# Patient Record
Sex: FEMALE | Race: WHITE | NOT HISPANIC OR LATINO | Employment: OTHER | URBAN - METROPOLITAN AREA
[De-identification: names, ages, dates, MRNs, and addresses within clinical notes are randomized per-mention and may not be internally consistent; named-entity substitution may affect disease eponyms.]

---

## 2024-01-17 ENCOUNTER — OFFICE VISIT (OUTPATIENT)
Dept: PSYCHOLOGY | Facility: CLINIC | Age: 74
End: 2024-01-17
Payer: MEDICARE

## 2024-01-17 DIAGNOSIS — R41.9 COGNITIVE COMPLAINTS: Primary | ICD-10-CM

## 2024-01-17 PROBLEM — F41.9 ANXIETY: Status: ACTIVE | Noted: 2024-01-17

## 2024-01-17 PROBLEM — R41.89 COGNITIVE DECLINE: Status: ACTIVE | Noted: 2024-01-17

## 2024-01-17 PROCEDURE — 96138 PSYCL/NRPSYC TECH 1ST: CPT | Mod: AH | Performed by: CLINICAL NEUROPSYCHOLOGIST

## 2024-01-17 PROCEDURE — 96139 PSYCL/NRPSYC TST TECH EA: CPT | Mod: AH | Performed by: CLINICAL NEUROPSYCHOLOGIST

## 2024-01-17 PROCEDURE — 96116 NUBHVL XM PHYS/QHP 1ST HR: CPT | Performed by: CLINICAL NEUROPSYCHOLOGIST

## 2024-01-17 PROCEDURE — 1159F MED LIST DOCD IN RCRD: CPT | Performed by: CLINICAL NEUROPSYCHOLOGIST

## 2024-01-17 PROCEDURE — 96133 NRPSYC TST EVAL PHYS/QHP EA: CPT | Mod: AH | Performed by: CLINICAL NEUROPSYCHOLOGIST

## 2024-01-17 PROCEDURE — 96139 PSYCL/NRPSYC TST TECH EA: CPT | Performed by: CLINICAL NEUROPSYCHOLOGIST

## 2024-01-17 PROCEDURE — 96132 NRPSYC TST EVAL PHYS/QHP 1ST: CPT | Performed by: CLINICAL NEUROPSYCHOLOGIST

## 2024-01-17 PROCEDURE — 96132 NRPSYC TST EVAL PHYS/QHP 1ST: CPT | Mod: AH | Performed by: CLINICAL NEUROPSYCHOLOGIST

## 2024-01-17 PROCEDURE — 96133 NRPSYC TST EVAL PHYS/QHP EA: CPT | Performed by: CLINICAL NEUROPSYCHOLOGIST

## 2024-01-17 PROCEDURE — 96116 NUBHVL XM PHYS/QHP 1ST HR: CPT | Mod: AH | Performed by: CLINICAL NEUROPSYCHOLOGIST

## 2024-01-17 PROCEDURE — 1160F RVW MEDS BY RX/DR IN RCRD: CPT | Performed by: CLINICAL NEUROPSYCHOLOGIST

## 2024-01-17 PROCEDURE — 96138 PSYCL/NRPSYC TECH 1ST: CPT | Performed by: CLINICAL NEUROPSYCHOLOGIST

## 2024-01-17 RX ORDER — LEVOTHYROXINE SODIUM 75 UG/1
TABLET ORAL
COMMUNITY
Start: 2023-11-15

## 2024-01-17 RX ORDER — SIMVASTATIN 40 MG/1
40 TABLET, FILM COATED ORAL DAILY
COMMUNITY
Start: 2023-10-28

## 2024-01-17 RX ORDER — ERGOCALCIFEROL 1.25 MG/1
CAPSULE ORAL
COMMUNITY
Start: 2023-04-11 | End: 2024-04-24 | Stop reason: ALTCHOICE

## 2024-01-17 RX ORDER — LOSARTAN POTASSIUM 50 MG/1
50 TABLET ORAL DAILY
COMMUNITY
Start: 2023-09-14

## 2024-01-17 NOTE — LETTER
2024     Joseph Hopkins MD  [address] [facility] [address]  [facility] [address]    Patient: Soraya Remy   YOB: 1950   Date of Visit: 2024       Dear Dr. Joseph Hopkins MD:    Thank you for referring Soraya Remy to me for evaluation. Below are my notes for this consultation.  If you have questions, please do not hesitate to call me. I look forward to following your patient along with you.       Sincerely,     Edith Ferguson, PhD      CC: No Recipients  ______________________________________________________________________________________    Neuropsychological Evaluation    Name: Soraya Remy  : 1950  MRN: [id number]  Referring Provider: Joseph Hopkins MD (Neurology)  Date of Service: 2024    Reason for Referral:  Ms. Remy was referred for neuropsychological assessment in the context of cognitive concerns. The purpose of this assessment was reviewed with the patient and written informed consent was obtained.    DIAGNOSIS:   1. Cognitive complaints       IMPRESSIONS/RECOMMENDATIONS:     Diagnostic Impression/Recommendations: Ms. Remy presented for evaluation of cognitive complaints though the results of this brief evaluation occur in the context of evidence for suboptimal test engagement. This limits my ability to interpret the data with an appropriate degree of confidence and draw sound diagnostic conclusions. The possibility of true cognitive impairment is not ruled out, though evaluation in the context of full test engagement is warranted to understand her cognitive profile. She does have known cognitive risk factors including cerebrovascular risk factors, hypothyroidism (with last known TSH to be high), disrupted sleep, and untreated anxiety. Targeting anxiety, sleep,  and ensuring that her hypothyroidism is adequately treated are all strongly recommended. She is also encouraged to complete the MRI that was previously ordered by Dr. Hopkins.  Neuropsychological re-evaluation could be considered in the context of optimized medical and psychiatric status.     Thank you for the opportunity to see this patient.    Please contact me with any additional questions or comments regarding this case.    ////////////////////////////////////////////////////////    HISTORY/PRIOR WORKUP    History: Ms. Remy is a 73-year-old, right-handed, , female with approximately 16 years of formal education. She is retired. She lives with her long term (35 years) significant other.      I found no prior neuropsychological evaluation in the medical record.     Briefly, she saw Dr. Hopkins on 08/21/2023 for evaluation of memory loss, referred by her PCP. Her neurological exam was noted to be fairly normal, with the exception of scoring 3/30 on the SLUMS (was noted to repeat questions and then appeared to skirt around the answer). An undiagnosed/untreated anxiety component was appreciated. She also reported her hand sometimes shakes. She was referred for the current evaluation and brain MRI (not completed). Prior labs from her PCP were noted to show recent high TSH. See note.     Medical history with potential for primary or secondary adverse cognitive effects include HTN, DLD, hypothyroidism, anxiety.     CLINICAL INTERVIEW:    Presenting Problem: Ms. Remy was accompanied to the current evaluation by her significant other Varun who was present for the clinical interview and provided collateral information. They reported the onset of cognitive changes beginning about 2 years ago in the context of thyroid medication adjustments and her having difficulty with taking the medication appropriately. There was worsening over time until her thyroid levels stabilized, at which point cognition also reportedly stabilized. Varun noted she retired 3-4 years ago and the lack of structure/organization seems to undermine cognitive skills; fatigue and anxiety also serve as exacerbating  factors. There are no marked cognitive fluctuations.     Specific concerns include difficulty articulating her thoughts/explaining things because she cannot find her words. She may find an alternative word to use; she does not appear to have loss of semantic knowledge. She can forget recent events (e.g., forgot grandkids came to visit about a month ago and a cue did not jog her memory). She routinely forgets to charge her cell phone. She misplaces items more frequently and may also put items away in the wrong (but not an unreasonable) location, despite have always stored such items in a designated location. She has difficulty focusing and is prone to losing her train of thought. Varun has noticed a tendency for her to jump around between tasks (eventually completes). Multitasking was never a strength and this is worse.     Functionally, she is now managing her medications; there was a period of time where she was having difficulty and Varun was assisting, though she is now reliable. Varun manages the finances (longstanding); she was previously writing some checks, though stopped due to writing difficulty related to hand shaking. Varun has been making/managing medical appointments for the past year. They shop together. She is no longer cooking like she used (does not feel comfortable). She engages in household tasks. She stopped driving 1-1.5 years ago because she did not feel comfortable (denied MVAs, tickets, or getting lost).     Psychiatric History/Status: She reported a longstanding tendency towards being a worrier that is stable; she has never been treated for anxiety. She denied adonis feelings of sadness, though described pessimism and social withdrawal; Varun reported she has always been private and somewhat “reclusive” but she is not talking with her family as much as she used to. When asked about her mood and how she has been feeling lately, she said “I wonder why I don't have more feelings.” No mood swings,  irritability, or personality changes endorsed. No recent changes in energy or appetite.     She denied suicidal or homicidal ideation/intent and also denied experiencing auditory or visual hallucinations. No paranoid type thoughts/beliefs.    There was no bradykinesia or hyperkinesia noted, and speech was of normal rate and volume. Her thoughts were well organized, with no evidence of tangentiality, circumstantiality, loosening of associations, or flight of ideas. There was no evidence of delusions, hallucinations, or illusions/misperceptions.    Substance Use: There is no reported history of significant alcohol abuse. There is no reported illicit substance use or deliberate misuse of prescription medications. She does not use tobacco products. Caffeine use is minimal.    Sleep: She has no difficulty initiating sleep, though does have trouble with sleep maintenance (x1-1.5 years). She obtains a variable number of hours of sleep per night. Varun noted she can readily fall asleep when idle (e.g., passenger in the car). She snores, but does not have known apneic events or REM sleep behavior disturbance.       Additional Medical History/Rule-Outs: There is no known history of head injury with alteration of consciousness, seizures, clinical cerebrovascular events, or other neurological disorder. There have been no gait disturbances or recent unprovoked falls. She reported intermittent right upper extremity tremor. There have been no recent episodes of urinary incontinence. Hearing and vision are adequate. She experiences dizziness; no syncopal events. She is not prone to headaches or migraines. She denied pain.    Developmental History: History is reportedly unremarkable for developmental milestones.     Academic/Employment/Social History: No personal history of academic difficulties. She graduated college. 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     Family History: There is a family history of dementia later in life (mother).     Legal History: She reported having no active legal issues.    ==========    Procedures/Tests:  In addition to the clinical interview, the following tests were administered:      Praxis screening items*  Performance validity measures  Wechsler Adult Intelligence Scale - IV (WAIS-IV)   Digit Span   Coding  Wechsler Test of Adult Reading (WTAR)  Trail Making Test parts A & B (Trails A, B)  Repeatable Battery for the Assessment of Neuropsychological Status (RBANS)   Line Orientation  Neuropsychological Assessment Battery (NAB)   Naming  Controlled Oral Word Fluency Test (COWAT)  Semantic Fluency  Brief Visuospatial Memory Test - Revised (BVMT-R)  Heller Verbal Learning Test - Revised (HVLT-R)  =====================================================================  Note: Testing was completed by a psychometrist unless otherwise noted; *indicates administered by neuropsychologist. Test results were interpreted in the context of appropriate normative data.    RESULTS:  General Behavior: She was alert. She was adequately groomed, appropriately dressed, and appeared her stated age. Affect appeared anxious and flat. She understood the purpose of the assessment. She provided her date of birth, though could not state her age (“I can't do the math.”). She was unable to provide the date; she did get the year with multiple choice options. She was oriented to place. She could not name the current [place] president or his predecessor (and did not benefit from cues). Spontaneous speech was fluent, grammatically correct, and conveyed information adequately. No paraphasias or circumlocutions were noted in spontaneous conversation. Verbal prosody was normal. Ideomotor praxis was normal on coarse screening. Comprehension was unremarkable in terms of her ability to understand interview questions. No perseverative, impulsive, jocular or  socially inappropriate tendencies were observed in incidental behavior.    During testing, she was pleasant, though anxious. Rapport was easily established. She was often reluctant to answer questions, seeking assistance on how to do tasks “right.” She required task instructions to be repeated and slowed down; she required some task instructions to be repeated mid-task after completing sample items with confidence. She was quick to give up when confused/unsure. She offered tangential remarks about her performance. Her testing pace was very slow.     Measures of performance validity fell below expectations on multiple indices which suggests suboptimal/inconsistent test engagement. Reduced test engagement can occur for a variety of reasons, including (but not limited to) emotional distress and fatigue. Ms. Remy appeared anxious during testing and I suspect this contributed to her testing approach at least to some degree. Regardless of the cause for reduced test engagement, I cannot interpret areas of possible impairment with a reasonable degree of confidence. Areas in which she performed in the broad average range can be assumed to reflect at least a minimal level of ability and will be provided below. Areas of possible impairment are not addressed as I cannot discern if performance reflects true impairment vs reduced test engagement.    Estimated baseline abilities fall in the average range based on single word reading ability and demographic variables. Basic auditory attentional capacity was low average. Basic design copy was intact.     ===========================================================    Cognitive testing was administered and interpretation of results included consideration of appropriate and relevant cultural-linguistic and demographic factors.  Cognitive testing was administered and results of assessment informed the determination of diagnosis or further clarified etiological factors of cognitive  impairment or complaints.    Time based service: 69505 (55 min, 1 unit); 33840 (60 min, 1 unit); 76157 (36 min, 1 units); 82632 (30 min, 1 unit); 93095 (135 min, 5 units).

## 2024-01-17 NOTE — PROGRESS NOTES
Neuropsychological Evaluation    Name: Soraya Remy  : 1950  MRN: [id number]  Referring Provider: Joseph Hopkins MD (Neurology)  Date of Service: 2024    Reason for Referral:  Ms. Remy was referred for neuropsychological assessment in the context of cognitive concerns. The purpose of this assessment was reviewed with the patient and written informed consent was obtained.    DIAGNOSIS:   1. Cognitive complaints       IMPRESSIONS/RECOMMENDATIONS:     Diagnostic Impression/Recommendations: Ms. Remy presented for evaluation of cognitive complaints though the results of this brief evaluation occur in the context of evidence for suboptimal test engagement. This limits my ability to interpret the data with an appropriate degree of confidence and draw sound diagnostic conclusions. The possibility of true cognitive impairment is not ruled out, though evaluation in the context of full test engagement is warranted to understand her cognitive profile. She does have known cognitive risk factors including cerebrovascular risk factors, hypothyroidism (with last known TSH to be high), disrupted sleep, and untreated anxiety. Targeting anxiety, sleep,  and ensuring that her hypothyroidism is adequately treated are all strongly recommended. She is also encouraged to complete the MRI that was previously ordered by Dr. Hopkins. Neuropsychological re-evaluation could be considered in the context of optimized medical and psychiatric status.     Thank you for the opportunity to see this patient.    Please contact me with any additional questions or comments regarding this case.    ////////////////////////////////////////////////////////    HISTORY/PRIOR WORKUP    History: Ms. Remy is a 73-year-old, right-handed, , female with approximately 16 years of formal education. She is retired. She lives with her long term (35 years) significant other.      I found no prior neuropsychological evaluation in the medical record.      Briefly, she saw Dr. Hopkins on 08/21/2023 for evaluation of memory loss, referred by her PCP. Her neurological exam was noted to be fairly normal, with the exception of scoring 3/30 on the SLUMS (was noted to repeat questions and then appeared to skirt around the answer). An undiagnosed/untreated anxiety component was appreciated. She also reported her hand sometimes shakes. She was referred for the current evaluation and brain MRI (not completed). Prior labs from her PCP were noted to show recent high TSH. See note.     Medical history with potential for primary or secondary adverse cognitive effects include HTN, DLD, hypothyroidism, anxiety.     CLINICAL INTERVIEW:    Presenting Problem: Ms. Remy was accompanied to the current evaluation by her significant other Varun who was present for the clinical interview and provided collateral information. They reported the onset of cognitive changes beginning about 2 years ago in the context of thyroid medication adjustments and her having difficulty with taking the medication appropriately. There was worsening over time until her thyroid levels stabilized, at which point cognition also reportedly stabilized. Varun noted she retired 3-4 years ago and the lack of structure/organization seems to undermine cognitive skills; fatigue and anxiety also serve as exacerbating factors. There are no marked cognitive fluctuations.     Specific concerns include difficulty articulating her thoughts/explaining things because she cannot find her words. She may find an alternative word to use; she does not appear to have loss of semantic knowledge. She can forget recent events (e.g., forgot grandkids came to visit about a month ago and a cue did not jog her memory). She routinely forgets to charge her cell phone. She misplaces items more frequently and may also put items away in the wrong (but not an unreasonable) location, despite have always stored such items in a designated location.  She has difficulty focusing and is prone to losing her train of thought. Varun has noticed a tendency for her to jump around between tasks (eventually completes). Multitasking was never a strength and this is worse.     Functionally, she is now managing her medications; there was a period of time where she was having difficulty and Varun was assisting, though she is now reliable. Varun manages the finances (longstanding); she was previously writing some checks, though stopped due to writing difficulty related to hand shaking. Varun has been making/managing medical appointments for the past year. They shop together. She is no longer cooking like she used (does not feel comfortable). She engages in household tasks. She stopped driving 1-1.5 years ago because she did not feel comfortable (denied MVAs, tickets, or getting lost).     Psychiatric History/Status: She reported a longstanding tendency towards being a worrier that is stable; she has never been treated for anxiety. She denied adonis feelings of sadness, though described pessimism and social withdrawal; Varun reported she has always been private and somewhat “reclusive” but she is not talking with her family as much as she used to. When asked about her mood and how she has been feeling lately, she said “I wonder why I don't have more feelings.” No mood swings, irritability, or personality changes endorsed. No recent changes in energy or appetite.     She denied suicidal or homicidal ideation/intent and also denied experiencing auditory or visual hallucinations. No paranoid type thoughts/beliefs.    There was no bradykinesia or hyperkinesia noted, and speech was of normal rate and volume. Her thoughts were well organized, with no evidence of tangentiality, circumstantiality, loosening of associations, or flight of ideas. There was no evidence of delusions, hallucinations, or illusions/misperceptions.    Substance Use: There is no reported history of significant  alcohol abuse. There is no reported illicit substance use or deliberate misuse of prescription medications. She does not use tobacco products. Caffeine use is minimal.    Sleep: She has no difficulty initiating sleep, though does have trouble with sleep maintenance (x1-1.5 years). She obtains a variable number of hours of sleep per night. Varun noted she can readily fall asleep when idle (e.g., passenger in the car). She snores, but does not have known apneic events or REM sleep behavior disturbance.       Additional Medical History/Rule-Outs: There is no known history of head injury with alteration of consciousness, seizures, clinical cerebrovascular events, or other neurological disorder. There have been no gait disturbances or recent unprovoked falls. She reported intermittent right upper extremity tremor. There have been no recent episodes of urinary incontinence. Hearing and vision are adequate. She experiences dizziness; no syncopal events. She is not prone to headaches or migraines. She denied pain.    Developmental History: History is reportedly unremarkable for developmental milestones.     Academic/Employment/Social History: No personal history of academic difficulties. She graduated college. 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     Family History: There is a family history of dementia later in life (mother).     Legal History: She reported having no active legal issues.    ==========    Procedures/Tests:  In addition to the clinical interview, the following tests were administered:      Praxis screening items*  Performance validity measures  Wechsler Adult Intelligence Scale - IV (WAIS-IV)   Digit Span   Coding  Wechsler Test of Adult Reading (WTAR)  Trail Making Test parts A & B (Trails A, B)  Repeatable Battery for the Assessment of Neuropsychological Status (RBANS)   Line Orientation  Neuropsychological  Assessment Battery (NAB)   Naming  Controlled Oral Word Fluency Test (COWAT)  Semantic Fluency  Brief Visuospatial Memory Test - Revised (BVMT-R)  Heller Verbal Learning Test - Revised (HVLT-R)  =====================================================================  Note: Testing was completed by a psychometrist unless otherwise noted; *indicates administered by neuropsychologist. Test results were interpreted in the context of appropriate normative data.    RESULTS:  General Behavior: She was alert. She was adequately groomed, appropriately dressed, and appeared her stated age. Affect appeared anxious and flat. She understood the purpose of the assessment. She provided her date of birth, though could not state her age (“I can't do the math.”). She was unable to provide the date; she did get the year with multiple choice options. She was oriented to place. She could not name the current [place] president or his predecessor (and did not benefit from cues). Spontaneous speech was fluent, grammatically correct, and conveyed information adequately. No paraphasias or circumlocutions were noted in spontaneous conversation. Verbal prosody was normal. Ideomotor praxis was normal on coarse screening. Comprehension was unremarkable in terms of her ability to understand interview questions. No perseverative, impulsive, jocular or socially inappropriate tendencies were observed in incidental behavior.    During testing, she was pleasant, though anxious. Rapport was easily established. She was often reluctant to answer questions, seeking assistance on how to do tasks “right.” She required task instructions to be repeated and slowed down; she required some task instructions to be repeated mid-task after completing sample items with confidence. She was quick to give up when confused/unsure. She offered tangential remarks about her performance. Her testing pace was very slow.     Measures of performance validity fell below expectations  on multiple indices which suggests suboptimal/inconsistent test engagement. Reduced test engagement can occur for a variety of reasons, including (but not limited to) emotional distress and fatigue. Ms. Remy appeared anxious during testing and I suspect this contributed to her testing approach at least to some degree. Regardless of the cause for reduced test engagement, I cannot interpret areas of possible impairment with a reasonable degree of confidence. Areas in which she performed in the broad average range can be assumed to reflect at least a minimal level of ability and will be provided below. Areas of possible impairment are not addressed as I cannot discern if performance reflects true impairment vs reduced test engagement.    Estimated baseline abilities fall in the average range based on single word reading ability and demographic variables. Basic auditory attentional capacity was low average. Basic design copy was intact.     ===========================================================    Cognitive testing was administered and interpretation of results included consideration of appropriate and relevant cultural-linguistic and demographic factors.  Cognitive testing was administered and results of assessment informed the determination of diagnosis or further clarified etiological factors of cognitive impairment or complaints.    Time based service: 74079 (55 min, 1 unit); 44687 (60 min, 1 unit); 46946 (36 min, 1 units); 94207 (30 min, 1 unit); 33008 (135 min, 5 units).

## 2024-01-29 ENCOUNTER — APPOINTMENT (OUTPATIENT)
Dept: NEUROLOGY | Facility: HOSPITAL | Age: 74
End: 2024-01-29

## 2024-04-18 ENCOUNTER — TELEPHONE (OUTPATIENT)
Dept: NEUROLOGY | Facility: HOSPITAL | Age: 74
End: 2024-04-18
Payer: MEDICARE

## 2024-04-18 NOTE — TELEPHONE ENCOUNTER
I now see that pt has had done neuropsych testing and results are here.  However, MRI brain not done, as well as B12.    Do you want to see this pt or reschedule?    I called her and she doesn't think she had the MRI and I don't see any results.  She remains confused.    Thank you.

## 2024-04-24 ENCOUNTER — LAB (OUTPATIENT)
Dept: LAB | Facility: LAB | Age: 74
End: 2024-04-24
Payer: MEDICARE

## 2024-04-24 ENCOUNTER — OFFICE VISIT (OUTPATIENT)
Dept: NEUROLOGY | Facility: HOSPITAL | Age: 74
End: 2024-04-24
Payer: MEDICARE

## 2024-04-24 VITALS — DIASTOLIC BLOOD PRESSURE: 86 MMHG | WEIGHT: 112.1 LBS | HEART RATE: 70 BPM | SYSTOLIC BLOOD PRESSURE: 155 MMHG

## 2024-04-24 DIAGNOSIS — F03.90 DEMENTIA, UNSPECIFIED DEMENTIA SEVERITY, UNSPECIFIED DEMENTIA TYPE, UNSPECIFIED WHETHER BEHAVIORAL, PSYCHOTIC, OR MOOD DISTURBANCE OR ANXIETY (MULTI): Primary | ICD-10-CM

## 2024-04-24 DIAGNOSIS — F03.90 DEMENTIA, UNSPECIFIED DEMENTIA SEVERITY, UNSPECIFIED DEMENTIA TYPE, UNSPECIFIED WHETHER BEHAVIORAL, PSYCHOTIC, OR MOOD DISTURBANCE OR ANXIETY (MULTI): ICD-10-CM

## 2024-04-24 DIAGNOSIS — F41.9 ANXIETY: ICD-10-CM

## 2024-04-24 LAB — VIT B12 SERPL-MCNC: 191 PG/ML (ref 211–911)

## 2024-04-24 PROCEDURE — 1157F ADVNC CARE PLAN IN RCRD: CPT | Performed by: PSYCHIATRY & NEUROLOGY

## 2024-04-24 PROCEDURE — 99214 OFFICE O/P EST MOD 30 MIN: CPT | Performed by: PSYCHIATRY & NEUROLOGY

## 2024-04-24 PROCEDURE — 1159F MED LIST DOCD IN RCRD: CPT | Performed by: PSYCHIATRY & NEUROLOGY

## 2024-04-24 PROCEDURE — 82607 VITAMIN B-12: CPT

## 2024-04-24 PROCEDURE — 1160F RVW MEDS BY RX/DR IN RCRD: CPT | Performed by: PSYCHIATRY & NEUROLOGY

## 2024-04-24 PROCEDURE — 1036F TOBACCO NON-USER: CPT | Performed by: PSYCHIATRY & NEUROLOGY

## 2024-04-24 PROCEDURE — 36415 COLL VENOUS BLD VENIPUNCTURE: CPT

## 2024-04-24 PROCEDURE — G2211 COMPLEX E/M VISIT ADD ON: HCPCS | Performed by: PSYCHIATRY & NEUROLOGY

## 2024-04-24 ASSESSMENT — ENCOUNTER SYMPTOMS
OCCASIONAL FEELINGS OF UNSTEADINESS: 0
DEPRESSION: 0
LOSS OF SENSATION IN FEET: 0

## 2024-04-24 NOTE — PROGRESS NOTES
"Follow-up visit    Visit type: Follow-up    PCP: Lisa Hendrickson, APRN-CNP.    Subjective     Soraya Remy is a 73 y.o. year old female here for follow-up. Last seen 8/21/23.     Patient is accompanied by significant other.       HPI    I previously saw her 8/21/23 for memory loss. Came with significant other, lives with significant other. Came late, seen anyway. Some notes from PCP seen scanned in EMR. Referred to neurology 9/2022. I see she was scheduled to see another  neurologist 5/2023 but was \"cancelled\". Referred again. No labs. States she can't answer question. ~1-2 years now. \"I used to be smart\". Per significant other, it appears problem is not just memory. She will remember details of things that she's not being asked for. But when asked directly she can't remember. She was found to have hypothyroidism. Thyroid med started, dose change \"helped quite a bit\". No triggers noted. She retired prior to this happening. Was a manager at a [employer]. Was not having any problems at work apparently. (+) anxiety--not diagnosed. Not treated. Cooking at home, not burning things. Forgets where things are. \"Where's the pan for this?\". Not driving anymore, states she stopped as driving \"was starting to make me nervous\". 1.5 years ago stopped. Used to be taking care of the bills, but was misplacing bills and missing payments, so now does it with significant other. PCP has not done any brain scan. Unknown lab work. Carotid US ordered by PCP, but not done due to ? code. No hallucinations. (+) mother has dementia, but at 90+ yo. No sz. No stroke. Also belatedly tells me sometimes shakes when using hand. Non-smoker. Hx drinking alcohol, 2 glasses of wine per week. During last visit, discussed check B12 level, do MRI brain wo, and do neuropsych testing.    Since last visit, neuropsych testing done was inconclusive.    Here for followup.    B12 not done. MRI brain not done. They state they were not aware it needs done. They " were in the office for the first and last visit and would have left with paperwork for it.    Significant other states she seems better for some reason. Conversations she seems somewhat better. May remember something else later. Can't come up with words, word finding difficulty.    Patient Active Problem List   Diagnosis    Cognitive decline    Anxiety       No Known Allergies    Current Outpatient Medications:     levothyroxine (Synthroid, Levoxyl) 75 mcg tablet, TAKE 1 TABLET BY MOUTH EVERY DAY IN THE MORNING ON EMPTY STOMACH, Disp: , Rfl:     losartan (Cozaar) 50 mg tablet, Take 1 tablet (50 mg) by mouth once daily., Disp: , Rfl:     simvastatin (Zocor) 40 mg tablet, Take 1 tablet (40 mg) by mouth once daily., Disp: , Rfl:     ergocalciferol (Vitamin D-2) 1.25 MG (65901 UT) capsule, TAKE 1 CAPSULE BY MOUTH ONCE A WEEK 60, Disp: , Rfl:      Objective     /86   Pulse 70   Wt 50.8 kg (112 lb 1.6 oz)      SLUMS 3/30 on 8/21/23      Awake, alert, oriented x3, in no distress  Well-nourished, ambulatory independently    Mental status exam as above, conversant   Full EOMs intact, no nystagmus, no ptosis   No facial droop   Hearing grossly intact   No dysarthria    Motor strength is at least antigravity on all extremities  Normal gait      Assessment/Plan     1. Dementia (?)  2. Anxiety, ? undiagnosed, untreated  Neuro exam actually fairly normal except for SLUMS 3/30  Treating hypothyroidism apparently improved memory to some extent     Discussed, my concern is that of clinical dementia  Unclear what, if any, possible anxiety (untreated) is contributing to this problem--it appears that pt has had chronic anxiety symptoms     Unfortunately, pt has not had testing done. The neuropsych testing that she did, was inconclusive.    Plans:  Still to check B12 level  Still to do MRI brain wo  3.   Refer to geriatric psychiatrist  4.   Optimize hypothyroid state c/o PCP     rtc after testing    All questions were answered.   Pt knows how to contact my office in case pt has any questions or concerns.    Joseph Hopkins MD

## 2024-04-24 NOTE — LETTER
"April 24, 2024     LANCE Perez  [address] [facility] [address]    Patient: Soraya Remy   YOB: 1950   Date of Visit: 4/24/2024       Dear LANCE Clark:    Thank you for referring Soraya Remy to me for evaluation. Below are my notes for this consultation.  If you have questions, please do not hesitate to call me. I look forward to following your patient along with you.       Sincerely,     Joseph Hopkins MD      CC: No Recipients  ______________________________________________________________________________________    Follow-up visit    Visit type: Follow-up    PCP: LANCE Perez.    Subjective    Soraya Remy is a 73 y.o. year old female here for follow-up. Last seen 8/21/23.     Patient is accompanied by significant other.       HPI    I previously saw her 8/21/23 for memory loss. Came with significant other, lives with significant other. Came late, seen anyway. Some notes from PCP seen scanned in EMR. Referred to neurology 9/2022. I see she was scheduled to see another  neurologist 5/2023 but was \"cancelled\". Referred again. No labs. States she can't answer question. ~1-2 years now. \"I used to be smart\". Per significant other, it appears problem is not just memory. She will remember details of things that she's not being asked for. But when asked directly she can't remember. She was found to have hypothyroidism. Thyroid med started, dose change \"helped quite a bit\". No triggers noted. She retired prior to this happening. 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 (+) anxiety--not diagnosed. Not treated. Cooking at home, not burning things. Forgets where things are. \"Where's the pan for this?\". Not driving anymore, states she stopped as driving \"was starting to make me nervous\". 1.5 years ago stopped. Used to be taking care of the bills, but was misplacing bills and missing payments, so now does it with " significant other. PCP has not done any brain scan. Unknown lab work. Carotid US ordered by PCP, but not done due to ? code. No hallucinations. (+) mother has dementia, but at 90+ yo. No sz. No stroke. Also belatedly tells me sometimes shakes when using hand. Non-smoker. Hx drinking alcohol, 2 glasses of wine per week. During last visit, discussed check B12 level, do MRI brain wo, and do neuropsych testing.    Since last visit, neuropsych testing done was inconclusive.    Here for followup.    B12 not done. MRI brain not done. They state they were not aware it needs done. They were in the office for the first and last visit and would have left with paperwork for it.    Significant other states she seems better for some reason. Conversations she seems somewhat better. May remember something else later. Can't come up with words, word finding difficulty.    Patient Active Problem List   Diagnosis   • Cognitive decline   • Anxiety       No Known Allergies    Current Outpatient Medications:   •  levothyroxine (Synthroid, Levoxyl) 75 mcg tablet, TAKE 1 TABLET BY MOUTH EVERY DAY IN THE MORNING ON EMPTY STOMACH, Disp: , Rfl:   •  losartan (Cozaar) 50 mg tablet, Take 1 tablet (50 mg) by mouth once daily., Disp: , Rfl:   •  simvastatin (Zocor) 40 mg tablet, Take 1 tablet (40 mg) by mouth once daily., Disp: , Rfl:   •  ergocalciferol (Vitamin D-2) 1.25 MG (39129 UT) capsule, TAKE 1 CAPSULE BY MOUTH ONCE A WEEK 60, Disp: , Rfl:      Objective    /86   Pulse 70   Wt 50.8 kg (112 lb 1.6 oz)      SLUMS 3/30 on 8/21/23      Awake, alert, oriented x3, in no distress  Well-nourished, ambulatory independently    Mental status exam as above, conversant   Full EOMs intact, no nystagmus, no ptosis   No facial droop   Hearing grossly intact   No dysarthria    Motor strength is at least antigravity on all extremities  Normal gait      Assessment/Plan    1. Dementia (?)  2. Anxiety, ? undiagnosed, untreated  Neuro exam actually  fairly normal except for SLUMS 3/30  Treating hypothyroidism apparently improved memory to some extent     Discussed, my concern is that of clinical dementia  Unclear what, if any, possible anxiety (untreated) is contributing to this problem--it appears that pt has had chronic anxiety symptoms     Unfortunately, pt has not had testing done. The neuropsych testing that she did, was inconclusive.    Plans:  Still to check B12 level  Still to do MRI brain wo  3.   Refer to geriatric psychiatrist  4.   Optimize hypothyroid state c/o PCP     rtc after testing    All questions were answered.  Pt knows how to contact my office in case pt has any questions or concerns.    Joseph Hopkins MD

## 2024-04-24 NOTE — PATIENT INSTRUCTIONS
Still to check B12 level  Still to do MRI brain wo  3.   Refer to geriatric psychiatrist  4.   Optimize hypothyroid state c/o PCP     rtc after testing

## 2024-04-25 ENCOUNTER — HOSPITAL ENCOUNTER (OUTPATIENT)
Dept: RADIOLOGY | Facility: CLINIC | Age: 74
Discharge: HOME | End: 2024-04-25
Payer: MEDICARE

## 2024-04-25 DIAGNOSIS — F03.90 DEMENTIA, UNSPECIFIED DEMENTIA SEVERITY, UNSPECIFIED DEMENTIA TYPE, UNSPECIFIED WHETHER BEHAVIORAL, PSYCHOTIC, OR MOOD DISTURBANCE OR ANXIETY (MULTI): ICD-10-CM

## 2024-04-25 PROCEDURE — 70551 MRI BRAIN STEM W/O DYE: CPT

## 2024-04-25 PROCEDURE — 70551 MRI BRAIN STEM W/O DYE: CPT | Performed by: RADIOLOGY

## 2024-04-26 ENCOUNTER — TELEPHONE (OUTPATIENT)
Dept: NEUROLOGY | Facility: HOSPITAL | Age: 74
End: 2024-04-26
Payer: MEDICARE

## 2024-04-26 NOTE — TELEPHONE ENCOUNTER
----- Message from Joseph Smalls MD sent at 4/24/2024  3:43 PM EDT -----  4/24/24 B12 very low.   Will have staff have her contact PCP start B12 injection. Contact her/sig other (pt has ? Dementia)    Dr smalls

## 2024-04-26 NOTE — TELEPHONE ENCOUNTER
ALISTAIRML.  Told pt she needs to confirm that she received this info.  If not, I will call her again.

## 2024-04-29 NOTE — TELEPHONE ENCOUNTER
VMLCB at pt's home and cell numbers, and also Varun's number (significant other).    Will wait for call back.

## 2024-05-09 NOTE — TELEPHONE ENCOUNTER
----- Message from Jospeh Smalls MD sent at 4/24/2024  3:43 PM EDT -----  4/24/24 B12 very low.   Will have staff have her contact PCP start B12 injection. Contact her/sig other (pt has ? Dementia)    Dr smalls

## 2024-05-10 ENCOUNTER — APPOINTMENT (OUTPATIENT)
Dept: RADIOLOGY | Facility: CLINIC | Age: 74
End: 2024-05-10
Payer: MEDICARE

## 2024-05-14 ENCOUNTER — HOSPITAL ENCOUNTER (OUTPATIENT)
Dept: RADIOLOGY | Facility: CLINIC | Age: 74
Discharge: HOME | End: 2024-05-14
Payer: MEDICARE

## 2024-05-14 DIAGNOSIS — R10.31 RIGHT LOWER QUADRANT PAIN: ICD-10-CM

## 2024-05-14 PROCEDURE — 76700 US EXAM ABDOM COMPLETE: CPT

## 2024-05-14 PROCEDURE — 76700 US EXAM ABDOM COMPLETE: CPT | Performed by: RADIOLOGY

## 2024-06-12 ENCOUNTER — APPOINTMENT (OUTPATIENT)
Dept: BEHAVIORAL HEALTH | Facility: CLINIC | Age: 74
End: 2024-06-12
Payer: MEDICARE

## 2024-06-12 VITALS
SYSTOLIC BLOOD PRESSURE: 152 MMHG | RESPIRATION RATE: 14 BRPM | DIASTOLIC BLOOD PRESSURE: 90 MMHG | HEART RATE: 82 BPM | WEIGHT: 107.6 LBS | TEMPERATURE: 98.5 F

## 2024-06-12 DIAGNOSIS — F03.90 DEMENTIA, UNSPECIFIED DEMENTIA SEVERITY, UNSPECIFIED DEMENTIA TYPE, UNSPECIFIED WHETHER BEHAVIORAL, PSYCHOTIC, OR MOOD DISTURBANCE OR ANXIETY (MULTI): ICD-10-CM

## 2024-06-12 DIAGNOSIS — F41.9 ANXIETY: ICD-10-CM

## 2024-06-12 PROBLEM — G31.09 FRONTOTEMPORAL DEMENTIA (MULTI): Status: ACTIVE | Noted: 2024-06-12

## 2024-06-12 PROBLEM — F02.80 FRONTOTEMPORAL DEMENTIA (MULTI): Status: ACTIVE | Noted: 2024-06-12

## 2024-06-12 PROCEDURE — 1160F RVW MEDS BY RX/DR IN RCRD: CPT | Performed by: PSYCHIATRY & NEUROLOGY

## 2024-06-12 PROCEDURE — 1159F MED LIST DOCD IN RCRD: CPT | Performed by: PSYCHIATRY & NEUROLOGY

## 2024-06-12 PROCEDURE — 1036F TOBACCO NON-USER: CPT | Performed by: PSYCHIATRY & NEUROLOGY

## 2024-06-12 PROCEDURE — 90792 PSYCH DIAG EVAL W/MED SRVCS: CPT | Performed by: PSYCHIATRY & NEUROLOGY

## 2024-06-12 PROCEDURE — 1157F ADVNC CARE PLAN IN RCRD: CPT | Performed by: PSYCHIATRY & NEUROLOGY

## 2024-06-12 PROCEDURE — 1126F AMNT PAIN NOTED NONE PRSNT: CPT | Performed by: PSYCHIATRY & NEUROLOGY

## 2024-06-12 RX ORDER — ESCITALOPRAM OXALATE 5 MG/1
5 TABLET ORAL DAILY
Qty: 30 TABLET | Refills: 1 | Status: SHIPPED | OUTPATIENT
Start: 2024-06-12 | End: 2024-08-11

## 2024-06-12 ASSESSMENT — ENCOUNTER SYMPTOMS
DECREASED CONCENTRATION: 1
TREMORS: 0
CONFUSION: 1
SLEEP DISTURBANCE: 1
AGITATION: 0
HALLUCINATIONS: 0
HYPERACTIVE: 0
SEIZURES: 0
NERVOUS/ANXIOUS: 1
DYSPHORIC MOOD: 0
FATIGUE: 1

## 2024-06-12 ASSESSMENT — PAIN SCALES - GENERAL: PAINLEVEL: 0-NO PAIN

## 2024-06-12 NOTE — PROGRESS NOTES
"Subjective   Patient ID: Soraya Remy is a 73 y.o. female who presents for anxiety  Presents with her significant other of 40 years  Patients stated she has hard time communicating, have difficulties talking to people, 2 years ago. Feels frustrated   Memory loss noticed 6 months ago, she has troubles articulating, would misplace things. Symptoms get worse when she is nervous. There are times when conversation is completely normal but other times worse   She has history of anxiety described as second guessing more prominent lately. Memory decline indlude long term memory, like she does not recognize her significant other`s daughters names, or does not remember vacations taken long time ago. Short term memory seems to be \" fairly good \".   She quit driving a year ago or so, needs supervision taking her meds, some times she does not remember if she took her meds. Her significant other and brother took over finances as some bills were not paid. He prepares food   No assistance needed dressing, showering,. Feeding herself or hygiene  Wanst to keep things in order more now than earlier   Sleep is bad, she goes to bed and starts organizing different papers that do not need to be organzied then feels tired during the day and takes naps   SLUMS 3/30 per neurology   Diagnosed with Dementia by neurology, low B12, TSH, received two B12 injections so far. Thyroid seems to be difficult to manage per her significant other   From neurology: Neuropsychological testing was inconclusive   MRI:   No evidence of acute infarct, intracranial mass effect or midline  shift.  Mild degree of nonspecific white matter signal compatible with  microangiopathy.  MoCA: 5/30   ( there is a lot of hesitancy, not willing to participate in some tasks)  Visuospatial: 0/5  Naming: 3/3  Delayed recall: 0/5  Attention: 1/6  Abstraction: 0/2  Language: 0/2  Orientation: 1/6          Current Outpatient Medications on File Prior to Visit   Medication " Sig Dispense Refill    levothyroxine (Synthroid, Levoxyl) 75 mcg tablet TAKE 1 TABLET BY MOUTH EVERY DAY IN THE MORNING ON EMPTY STOMACH      losartan (Cozaar) 50 mg tablet Take 1 tablet (50 mg) by mouth once daily.      simvastatin (Zocor) 40 mg tablet Take 1 tablet (40 mg) by mouth once daily.       No current facility-administered medications on file prior to visit.      Patient Active Problem List   Diagnosis    Dementia (Multi)    Anxiety      Past Psychiatric History:  No past inpatient admissions, no history of suicide or self harm   No past treatment with psychotropics   No history of hallucinations   Chronic anxiety   Substance Abuse History:  None  Family History:  Depression, nephew  of suicide   Social History:  Social History     Socioeconomic History    Marital status: Single     Spouse name: Not on file    Number of children: Not on file    Years of education: Not on file    Highest education level: Not on file   Occupational History    Not on file   Tobacco Use    Smoking status: Never    Smokeless tobacco: Never   Substance and Sexual Activity    Alcohol use: Yes     Alcohol/week: 1.0 standard drink of alcohol     Types: 1 Glasses of wine per week     Comment: 1 glass/week    Drug use: Never    Sexual activity: Not on file   Other Topics Concern    Not on file   Social History Narrative    Not on file     Social Determinants of Health     Financial Resource Strain: Low Risk  (2021)    Received from [facility]    Overall Financial Resource Strain (CARDIA)     Difficulty of Paying Living Expenses: Not hard at all   Food Insecurity: No Food Insecurity (2021)    Received from [facility]    Hunger Vital Sign     Worried About Running Out of Food in the Last Year: Never true     Ran Out of Food in the Last Year: Never true   Transportation Needs: No Transportation Needs (2021)    Received from [facility]    PRAPARE - Transportation     Lack of Transportation (Medical):  No     Lack of Transportation (Non-Medical): No   Physical Activity: Insufficiently Active (2/19/2021)    Received from [facility]    Exercise Vital Sign     Days of Exercise per Week: 1 day     Minutes of Exercise per Session: 10 min   Stress: Stress Concern Present (2/19/2021)    Received from [facility]    [employer] - Occupational Stress Questionnaire     Feeling of Stress : To some extent   Social Connections: Socially Isolated (2/19/2021)    Received from [facility]    Social Connection and Isolation Panel [NHANES]     Frequency of Communication with Friends and Family: More than three times a week     Frequency of Social Gatherings with Friends and Family: More than three times a week     Attends Anab[employer]ist Services: Never     Active Member of Clubs or Organizations: No     Attends Club or Organization Meetings: Never     Marital Status: Never    Intimate Partner Violence: Not on file   Housing Stability: Low Risk  (2/19/2021)    Received from [facility]    Housing Stability Vital Sign     Unable to Pay for Housing in the Last Year: No     Number of Places Lived in the Last Year: 0     Unstable Housing in the Last Year: No        Born in [place], raised by parents, has 2 sister and one brother   Strict and [employer] upbringing   No history of trauma  Never , no children, retired now. Worked as an  initially         Review of Systems   Constitutional:  Positive for fatigue.   Musculoskeletal:  Negative for gait problem.   Neurological:  Negative for tremors and seizures.   Psychiatric/Behavioral:  Positive for confusion, decreased concentration and sleep disturbance. Negative for agitation, behavioral problems, dysphoric mood, hallucinations, self-injury and suicidal ideas. The patient is nervous/anxious. The patient is not hyperactive.        Objective   There were no vitals filed for this visit.   Physical Exam  Psychiatric:          "Attention and Perception: She is inattentive.         Mood and Affect: Mood is anxious.         Speech: Speech is not rapid and pressured, slurred or tangential.         Behavior: Behavior normal.         Thought Content: Thought content normal.         Cognition and Memory: Cognition is impaired.         Judgment: Judgment is inappropriate. Judgment is not impulsive.      Comments: Speech is impaired, irrelevant answers, aphasia, not able to articulate, then anxious          Lab Review:   Lab on 04/24/2024   Component Date Value    Vitamin B12 04/24/2024 191 (L)      No results found for: \"NA\", \"K\", \"CO2\", \"BUN\", \"CREATININE\", \"GLUCOSE\", \"CALCIUM\"  No results found for: \"WBC\", \"HGB\", \"HCT\", \"MCV\", \"PLT\"  No results found for: \"CHOL\", \"TRIG\", \"HDL\", \"LDLDIRECT\"    Assessment/Plan   Problem List Items Addressed This Visit       Dementia (Multi)    Anxiety       Ritual behavior, aphasia and anxiety suggestive of FTD pathology   Follow up with PCP for low B12 and thyroid and correct them   A trial of Lexapro top address anxiety   Follow up in 2 months or sooner if needed  [facility] MD   "

## 2024-06-13 ENCOUNTER — HOSPITAL ENCOUNTER (OUTPATIENT)
Dept: RADIOLOGY | Facility: CLINIC | Age: 74
Discharge: HOME | End: 2024-06-13
Payer: MEDICARE

## 2024-06-13 DIAGNOSIS — E03.9 HYPOTHYROIDISM, UNSPECIFIED: ICD-10-CM

## 2024-06-13 PROCEDURE — 76536 US EXAM OF HEAD AND NECK: CPT | Performed by: RADIOLOGY

## 2024-06-13 PROCEDURE — 76536 US EXAM OF HEAD AND NECK: CPT

## 2024-06-14 ENCOUNTER — TELEPHONE (OUTPATIENT)
Dept: BEHAVIORAL HEALTH | Facility: CLINIC | Age: 74
End: 2024-06-14
Payer: MEDICARE

## 2024-06-14 DIAGNOSIS — G31.09 FRONTOTEMPORAL DEMENTIA (MULTI): ICD-10-CM

## 2024-06-14 DIAGNOSIS — F02.80 FRONTOTEMPORAL DEMENTIA (MULTI): ICD-10-CM

## 2024-06-18 ENCOUNTER — TELEPHONE (OUTPATIENT)
Dept: BEHAVIORAL HEALTH | Facility: CLINIC | Age: 74
End: 2024-06-18
Payer: MEDICARE

## 2024-06-21 ENCOUNTER — HOSPITAL ENCOUNTER (OUTPATIENT)
Dept: RADIOLOGY | Facility: HOSPITAL | Age: 74
Discharge: HOME | End: 2024-06-21
Payer: MEDICARE

## 2024-06-21 DIAGNOSIS — F02.80 FRONTOTEMPORAL DEMENTIA (MULTI): ICD-10-CM

## 2024-06-21 DIAGNOSIS — G31.09 FRONTOTEMPORAL DEMENTIA (MULTI): ICD-10-CM

## 2024-06-21 PROCEDURE — 3430000001 HC RX 343 DIAGNOSTIC RADIOPHARMACEUTICALS: Performed by: PSYCHIATRY & NEUROLOGY

## 2024-06-21 PROCEDURE — 78608 BRAIN IMAGING (PET): CPT | Mod: PI

## 2024-06-21 PROCEDURE — A9552 F18 FDG: HCPCS | Performed by: PSYCHIATRY & NEUROLOGY

## 2024-06-21 RX ORDER — FLUDEOXYGLUCOSE F 18 200 MCI/ML
12.1 INJECTION, SOLUTION INTRAVENOUS
Status: COMPLETED | OUTPATIENT
Start: 2024-06-21 | End: 2024-06-21

## 2024-07-17 DIAGNOSIS — F41.9 ANXIETY: ICD-10-CM

## 2024-07-17 RX ORDER — ESCITALOPRAM OXALATE 5 MG/1
5 TABLET ORAL DAILY
Qty: 30 TABLET | Refills: 0 | Status: SHIPPED | OUTPATIENT
Start: 2024-07-17 | End: 2024-08-16

## 2024-08-12 ENCOUNTER — APPOINTMENT (OUTPATIENT)
Dept: BEHAVIORAL HEALTH | Facility: CLINIC | Age: 74
End: 2024-08-12
Payer: MEDICARE

## 2024-08-12 VITALS
WEIGHT: 99 LBS | SYSTOLIC BLOOD PRESSURE: 162 MMHG | TEMPERATURE: 98.3 F | RESPIRATION RATE: 16 BRPM | HEART RATE: 62 BPM | DIASTOLIC BLOOD PRESSURE: 88 MMHG

## 2024-08-12 DIAGNOSIS — F03.90 DEMENTIA, UNSPECIFIED DEMENTIA SEVERITY, UNSPECIFIED DEMENTIA TYPE, UNSPECIFIED WHETHER BEHAVIORAL, PSYCHOTIC, OR MOOD DISTURBANCE OR ANXIETY (MULTI): ICD-10-CM

## 2024-08-12 DIAGNOSIS — F41.9 ANXIETY: ICD-10-CM

## 2024-08-12 PROCEDURE — 99214 OFFICE O/P EST MOD 30 MIN: CPT | Performed by: PSYCHIATRY & NEUROLOGY

## 2024-08-12 PROCEDURE — 1126F AMNT PAIN NOTED NONE PRSNT: CPT | Performed by: PSYCHIATRY & NEUROLOGY

## 2024-08-12 PROCEDURE — 1036F TOBACCO NON-USER: CPT | Performed by: PSYCHIATRY & NEUROLOGY

## 2024-08-12 PROCEDURE — 1160F RVW MEDS BY RX/DR IN RCRD: CPT | Performed by: PSYCHIATRY & NEUROLOGY

## 2024-08-12 PROCEDURE — 1157F ADVNC CARE PLAN IN RCRD: CPT | Performed by: PSYCHIATRY & NEUROLOGY

## 2024-08-12 PROCEDURE — 1159F MED LIST DOCD IN RCRD: CPT | Performed by: PSYCHIATRY & NEUROLOGY

## 2024-08-12 RX ORDER — ESCITALOPRAM OXALATE 5 MG/1
5 TABLET ORAL DAILY
Qty: 90 TABLET | Refills: 0 | Status: SHIPPED | OUTPATIENT
Start: 2024-08-12

## 2024-08-12 RX ORDER — DONEPEZIL HYDROCHLORIDE 5 MG/1
5 TABLET, FILM COATED ORAL NIGHTLY
Qty: 30 TABLET | Refills: 2 | Status: SHIPPED | OUTPATIENT
Start: 2024-08-12 | End: 2024-11-10

## 2024-08-12 ASSESSMENT — ENCOUNTER SYMPTOMS
APPETITE CHANGE: 1
AGITATION: 0
SLEEP DISTURBANCE: 0
HALLUCINATIONS: 0
NERVOUS/ANXIOUS: 0
VOMITING: 0
CONFUSION: 1
PALPITATIONS: 0
HYPERACTIVE: 0
DECREASED CONCENTRATION: 1
DIZZINESS: 0
DYSPHORIC MOOD: 0
TREMORS: 0

## 2024-08-12 ASSESSMENT — PAIN SCALES - GENERAL: PAINLEVEL: 0-NO PAIN

## 2024-08-12 NOTE — PROGRESS NOTES
Subjective   Patient ID: Soraya Remy is a 74 y.o. female who presents for follow up  Her brother, sister and her significant other were present   PET scan:  Decreased FDG activity within the left temporoparietal lobes as well  as mildly decreased activity within the right temporoparietal lobes.    There is also mild decreased FDG activity within the left frontal  lobe.    Happier on Lexapro, not as much depressed or sad, seems more engaged   Sleep and appetite are good   Memory remains impaired, not preparing food, loss of weight.   The more anxious she is, she has hard time getting words out.   Overall, depression and anxiety 75 % better,  memory is the same, not better or worse.         Current Outpatient Medications on File Prior to Visit   Medication Sig Dispense Refill    escitalopram (Lexapro) 5 mg tablet TAKE 1 TABLET BY MOUTH EVERY DAY 90 tablet 0    levothyroxine (Synthroid, Levoxyl) 75 mcg tablet TAKE 1 TABLET BY MOUTH EVERY DAY IN THE MORNING ON EMPTY STOMACH      losartan (Cozaar) 50 mg tablet Take 1 tablet (50 mg) by mouth once daily.      simvastatin (Zocor) 40 mg tablet Take 1 tablet (40 mg) by mouth once daily.      [DISCONTINUED] escitalopram (Lexapro) 5 mg tablet Take 1 tablet (5 mg) by mouth once daily. 30 tablet 0     No current facility-administered medications on file prior to visit.      Patient Active Problem List   Diagnosis    Dementia (Multi)    Anxiety    Frontotemporal dementia (Multi)      Review of Systems   Constitutional:  Positive for appetite change.   Cardiovascular:  Negative for chest pain and palpitations.   Gastrointestinal:  Negative for vomiting.   Musculoskeletal:  Negative for gait problem.   Neurological:  Negative for dizziness and tremors.   Psychiatric/Behavioral:  Positive for confusion and decreased concentration. Negative for agitation, behavioral problems, dysphoric mood, hallucinations, self-injury, sleep disturbance and suicidal ideas. The patient is not  "nervous/anxious and is not hyperactive.        Objective   Physical Exam  Psychiatric:         Attention and Perception: She is inattentive.         Mood and Affect: Affect is inappropriate.         Behavior: Behavior is not agitated.         Thought Content: Thought content is not paranoid or delusional.         Cognition and Memory: Cognition is impaired.         Judgment: Judgment is not impulsive.      Comments: Aphasia       There were no vitals filed for this visit.   Lab on 04/24/2024   Component Date Value    Vitamin B12 04/24/2024 191 (L)      No results found for: \"NA\", \"K\", \"CO2\", \"BUN\", \"CREATININE\", \"GLUCOSE\", \"CALCIUM\"  No results found for: \"WBC\", \"HGB\", \"HCT\", \"MCV\", \"PLT\"  No results found for: \"CHOL\", \"TRIG\", \"HDL\", \"LDLDIRECT\"  Assessment/Plan   Problem List Items Addressed This Visit             ICD-10-CM    Dementia (Multi) F03.90    Anxiety F41.9        Continue Lexapro 5, a trial f Aricept 5 mg po at bedtime   Follow up with neurology   Discussed non pharmacological intervention, better control of blood pressure to follow up with PCP for that, discussed provisional diagnosis of Alzheimer`s   Follow up in 3 months or sooner if needed  "

## 2024-08-12 NOTE — PATIENT INSTRUCTIONS
Continue Lexapro 5, a trial f Aricept 5 mg po at bedtime   Follow up with neurology   Discussed non pharmacological intervention, better control of blood pressure to follow up with PCP for that, discussed provisional diagnosis of Alzheimer`s   Follow up in 3 months or sooner if needed

## 2024-12-05 ENCOUNTER — HOSPITAL ENCOUNTER (OUTPATIENT)
Dept: RADIOLOGY | Facility: CLINIC | Age: 74
Discharge: HOME | End: 2024-12-05
Payer: MEDICARE

## 2024-12-05 DIAGNOSIS — M79.672 PAIN IN LEFT FOOT: ICD-10-CM

## 2024-12-05 PROCEDURE — 73630 X-RAY EXAM OF FOOT: CPT | Mod: LT

## 2025-02-17 DIAGNOSIS — F41.9 ANXIETY: ICD-10-CM

## 2025-02-17 RX ORDER — ESCITALOPRAM OXALATE 5 MG/1
5 TABLET ORAL DAILY
Qty: 90 TABLET | Refills: 0 | OUTPATIENT
Start: 2025-02-17